# Patient Record
Sex: FEMALE
[De-identification: names, ages, dates, MRNs, and addresses within clinical notes are randomized per-mention and may not be internally consistent; named-entity substitution may affect disease eponyms.]

---

## 2024-09-10 ENCOUNTER — APPOINTMENT (OUTPATIENT)
Dept: OBGYN | Facility: CLINIC | Age: 39
End: 2024-09-10

## 2024-09-10 VITALS
SYSTOLIC BLOOD PRESSURE: 119 MMHG | BODY MASS INDEX: 32.82 KG/M2 | HEIGHT: 65 IN | WEIGHT: 197 LBS | DIASTOLIC BLOOD PRESSURE: 78 MMHG

## 2024-09-10 DIAGNOSIS — Z34.91 ENCOUNTER FOR SUPERVISION OF NORMAL PREGNANCY, UNSPECIFIED, FIRST TRIMESTER: ICD-10-CM

## 2024-09-10 PROBLEM — Z00.00 ENCOUNTER FOR PREVENTIVE HEALTH EXAMINATION: Status: ACTIVE | Noted: 2024-09-10

## 2024-09-10 PROCEDURE — 99203 OFFICE O/P NEW LOW 30 MIN: CPT | Mod: 25

## 2024-09-10 PROCEDURE — 76817 TRANSVAGINAL US OBSTETRIC: CPT

## 2024-09-10 PROCEDURE — 36415 COLL VENOUS BLD VENIPUNCTURE: CPT

## 2024-09-10 NOTE — END OF VISIT
[FreeTextEntry3] : I, Teresa Herrera, acted as a scribe on behalf of Dr. Clarissa Brooks M.D. on 09/10/2024.   All medical entries made by the scribe were at my Dr. Clarissa Brooks M.D direction and personally dictated by me on 09/10/2024. I have reviewed the chart and agree that the record accurately reflects my personal performance of the history, physical exam, assessment and plan. I have also personally directed, reviewed, and agreed with the chart.

## 2024-09-10 NOTE — PLAN
[FreeTextEntry1] : 39 year old  measuring 7 weeks 5 days for an amenorrhea visit.  -NOB bloods -Pap done today -Pt to bring genetics to next appt -Early GCT for h/o PCOS -Advised to start bASA daily for AMA -RTO 5 weeks for NT/NIPS

## 2024-09-10 NOTE — HISTORY OF PRESENT ILLNESS
[FreeTextEntry1] : 39 year old  at 7 weeks 5 days presents for an amenorrhea visit. +HPT.  OBHx:  x1 (10/2020, girl) GYNHx: PCOS  PMHx: Subclinical hypothyroidism PSHx: None  FHx: Chronic htn (mother, father), hyperlipidemia (mother, father), glaucoma (mother, grandfather), prostate cancer (father) SocHx: None  Meds: PNV NKDA [Mammogramdate] : 09/23 [PapSmeardate] : 06/23

## 2024-09-13 LAB
25(OH)D3 SERPL-MCNC: 32.2 NG/ML
ABO + RH PNL BLD: NORMAL
B19V IGG SER QL IA: 7.57 INDEX
B19V IGG+IGM SER-IMP: NORMAL
B19V IGG+IGM SER-IMP: POSITIVE
B19V IGM FLD-ACNC: 0.19 INDEX
B19V IGM SER-ACNC: NEGATIVE
BACTERIA UR CULT: NORMAL
BASOPHILS # BLD AUTO: 0.05 K/UL
BASOPHILS NFR BLD AUTO: 0.6 %
BLD GP AB SCN SERPL QL: NORMAL
BV BACTERIA RRNA VAG QL NAA+PROBE: DETECTED
C GLABRATA RNA VAG QL NAA+PROBE: NOT DETECTED
C TRACH RRNA SPEC QL NAA+PROBE: NOT DETECTED
CANDIDA RRNA VAG QL PROBE: NOT DETECTED
EOSINOPHIL # BLD AUTO: 0.09 K/UL
EOSINOPHIL NFR BLD AUTO: 1 %
HBV SURFACE AG SER QL: NONREACTIVE
HCT VFR BLD CALC: 37.3 %
HCV AB SER QL: NONREACTIVE
HCV S/CO RATIO: 0.13 S/CO
HGB A MFR BLD: 98.1 %
HGB A2 MFR BLD: 1.6 %
HGB BLD-MCNC: 13 G/DL
HGB F MFR BLD: 0.3 %
HGB FRACT BLD-IMP: NORMAL
HIV1+2 AB SPEC QL IA.RAPID: NONREACTIVE
HPV HIGH+LOW RISK DNA PNL CVX: NOT DETECTED
IMM GRANULOCYTES NFR BLD AUTO: 0.8 %
LEAD BLD-MCNC: <1 UG/DL
LYMPHOCYTES # BLD AUTO: 2.89 K/UL
LYMPHOCYTES NFR BLD AUTO: 32.1 %
MAN DIFF?: NORMAL
MCHC RBC-ENTMCNC: 32.1 PG
MCHC RBC-ENTMCNC: 34.9 GM/DL
MCV RBC AUTO: 92.1 FL
MEV IGG FLD QL IA: 5.91 AU/ML
MEV IGG+IGM SER-IMP: NEGATIVE
MONOCYTES # BLD AUTO: 0.51 K/UL
MONOCYTES NFR BLD AUTO: 5.7 %
MUV AB SER-ACNC: POSITIVE
MUV IGG SER QL IA: 95.6 AU/ML
N GONORRHOEA RRNA SPEC QL NAA+PROBE: NOT DETECTED
NEUTROPHILS # BLD AUTO: 5.39 K/UL
NEUTROPHILS NFR BLD AUTO: 59.8 %
PLATELET # BLD AUTO: 336 K/UL
RBC # BLD: 4.05 M/UL
RBC # FLD: 12.8 %
RUBV IGG FLD-ACNC: 7.12 INDEX
RUBV IGG FLD-ACNC: 7.12 INDEX
RUBV IGG SER-IMP: POSITIVE
RUBV IGG SER-IMP: POSITIVE
T PALLIDUM AB SER QL IA: NEGATIVE
T VAGINALIS RRNA SPEC QL NAA+PROBE: NOT DETECTED
TSH SERPL-ACNC: 3.44 UIU/ML
VZV AB TITR SER: POSITIVE
VZV IGG SER IF-ACNC: 2.95 S/CO
WBC # FLD AUTO: 9 K/UL

## 2024-09-17 ENCOUNTER — TRANSCRIPTION ENCOUNTER (OUTPATIENT)
Age: 39
End: 2024-09-17

## 2024-09-18 ENCOUNTER — TRANSCRIPTION ENCOUNTER (OUTPATIENT)
Age: 39
End: 2024-09-18

## 2024-09-18 DIAGNOSIS — N76.0 ACUTE VAGINITIS: ICD-10-CM

## 2024-09-18 DIAGNOSIS — B96.89 ACUTE VAGINITIS: ICD-10-CM

## 2024-09-18 RX ORDER — CLINDAMYCIN PHOSPHATE 100 MG/1
100 SUPPOSITORY VAGINAL
Qty: 3 | Refills: 0 | Status: ACTIVE | COMMUNITY
Start: 2024-09-18 | End: 1900-01-01

## 2024-09-19 ENCOUNTER — TRANSCRIPTION ENCOUNTER (OUTPATIENT)
Age: 39
End: 2024-09-19

## 2024-09-19 RX ORDER — METRONIDAZOLE 7.5 MG/G
0.75 GEL VAGINAL
Qty: 1 | Refills: 0 | Status: ACTIVE | COMMUNITY
Start: 2024-09-19 | End: 1900-01-01

## 2024-09-23 ENCOUNTER — TRANSCRIPTION ENCOUNTER (OUTPATIENT)
Age: 39
End: 2024-09-23

## 2024-09-24 ENCOUNTER — APPOINTMENT (OUTPATIENT)
Dept: OBGYN | Facility: CLINIC | Age: 39
End: 2024-09-24

## 2024-10-04 PROBLEM — N91.1 SECONDARY AMENORRHEA: Status: ACTIVE | Noted: 2024-10-04

## 2024-10-12 ENCOUNTER — NON-APPOINTMENT (OUTPATIENT)
Age: 39
End: 2024-10-12

## 2024-10-14 ENCOUNTER — NON-APPOINTMENT (OUTPATIENT)
Age: 39
End: 2024-10-14

## 2024-10-14 ENCOUNTER — TRANSCRIPTION ENCOUNTER (OUTPATIENT)
Age: 39
End: 2024-10-14

## 2024-10-15 ENCOUNTER — NON-APPOINTMENT (OUTPATIENT)
Age: 39
End: 2024-10-15

## 2024-10-15 ENCOUNTER — APPOINTMENT (OUTPATIENT)
Dept: OBGYN | Facility: CLINIC | Age: 39
End: 2024-10-15
Payer: COMMERCIAL

## 2024-10-15 ENCOUNTER — ASOB RESULT (OUTPATIENT)
Age: 39
End: 2024-10-15

## 2024-10-15 VITALS
DIASTOLIC BLOOD PRESSURE: 76 MMHG | HEART RATE: 84 BPM | WEIGHT: 196 LBS | BODY MASS INDEX: 32.65 KG/M2 | HEIGHT: 65 IN | SYSTOLIC BLOOD PRESSURE: 122 MMHG

## 2024-10-15 PROCEDURE — 76813 OB US NUCHAL MEAS 1 GEST: CPT

## 2024-10-15 PROCEDURE — 36415 COLL VENOUS BLD VENIPUNCTURE: CPT

## 2024-10-15 PROCEDURE — 76801 OB US < 14 WKS SINGLE FETUS: CPT

## 2024-10-15 PROCEDURE — 0500F INITIAL PRENATAL CARE VISIT: CPT

## 2024-10-21 ENCOUNTER — TRANSCRIPTION ENCOUNTER (OUTPATIENT)
Age: 39
End: 2024-10-21

## 2024-10-21 LAB
GLUCOSE QUALITATIVE U: NEGATIVE
PROTEIN URINE: NEGATIVE

## 2024-10-22 ENCOUNTER — TRANSCRIPTION ENCOUNTER (OUTPATIENT)
Age: 39
End: 2024-10-22

## 2024-10-24 ENCOUNTER — TRANSCRIPTION ENCOUNTER (OUTPATIENT)
Age: 39
End: 2024-10-24

## 2024-11-15 ENCOUNTER — NON-APPOINTMENT (OUTPATIENT)
Age: 39
End: 2024-11-15

## 2024-11-15 ENCOUNTER — APPOINTMENT (OUTPATIENT)
Dept: OBGYN | Facility: CLINIC | Age: 39
End: 2024-11-15
Payer: COMMERCIAL

## 2024-11-15 DIAGNOSIS — Z34.92 ENCOUNTER FOR SUPERVISION OF NORMAL PREGNANCY, UNSPECIFIED, SECOND TRIMESTER: ICD-10-CM

## 2024-11-15 DIAGNOSIS — Z23 ENCOUNTER FOR IMMUNIZATION: ICD-10-CM

## 2024-11-15 PROCEDURE — 36415 COLL VENOUS BLD VENIPUNCTURE: CPT

## 2024-11-15 PROCEDURE — 90656 IIV3 VACC NO PRSV 0.5 ML IM: CPT

## 2024-11-15 PROCEDURE — 0502F SUBSEQUENT PRENATAL CARE: CPT

## 2024-11-15 PROCEDURE — 90471 IMMUNIZATION ADMIN: CPT

## 2024-11-18 ENCOUNTER — TRANSCRIPTION ENCOUNTER (OUTPATIENT)
Age: 39
End: 2024-11-18

## 2024-11-18 LAB
AFP INTERP SERPL-IMP: NORMAL
AFP INTERP SERPL-IMP: NORMAL
AFP MOM CUT-OFF: 2.5
AFP MOM SERPL: 0.92
AFP PERCENTILE: 40.4
AFP SERPL-ACNC: 30.14 NG/ML
CARBAMAZEPINE?: NO
CURRENT SMOKER: NORMAL
DIABETES STATUS PATIENT: NORMAL
GA: NORMAL
GESTATIONAL AGE METHOD: NORMAL
HX OF NTD NARR: NORMAL
MULTIPLE PREGNANCY: NORMAL
NEURAL TUBE DEFECT RISK FETUS: NORMAL
NEURAL TUBE DEFECT RISK POP: NORMAL
RECOM F/U: NO
TEST PERFORMANCE INFO SPEC: NORMAL
VALPROIC ACID?: NORMAL

## 2024-11-20 ENCOUNTER — NON-APPOINTMENT (OUTPATIENT)
Age: 39
End: 2024-11-20

## 2024-11-20 ENCOUNTER — APPOINTMENT (OUTPATIENT)
Dept: OBGYN | Facility: CLINIC | Age: 39
End: 2024-11-20

## 2024-11-20 ENCOUNTER — TRANSCRIPTION ENCOUNTER (OUTPATIENT)
Age: 39
End: 2024-11-20

## 2024-11-20 ENCOUNTER — APPOINTMENT (OUTPATIENT)
Dept: OBGYN | Facility: CLINIC | Age: 39
End: 2024-11-20
Payer: COMMERCIAL

## 2024-11-20 DIAGNOSIS — Z13.1 ENCOUNTER FOR SCREENING FOR DIABETES MELLITUS: ICD-10-CM

## 2024-11-20 DIAGNOSIS — E28.2 POLYCYSTIC OVARIAN SYNDROME: ICD-10-CM

## 2024-11-20 PROCEDURE — 36415 COLL VENOUS BLD VENIPUNCTURE: CPT

## 2024-11-21 ENCOUNTER — TRANSCRIPTION ENCOUNTER (OUTPATIENT)
Age: 39
End: 2024-11-21

## 2024-11-21 LAB — GLUCOSE 1H P 50 G GLC PO SERPL-MCNC: 115 MG/DL

## 2024-12-06 ENCOUNTER — APPOINTMENT (OUTPATIENT)
Dept: ANTEPARTUM | Facility: CLINIC | Age: 39
End: 2024-12-06
Payer: COMMERCIAL

## 2024-12-06 ENCOUNTER — ASOB RESULT (OUTPATIENT)
Age: 39
End: 2024-12-06

## 2024-12-06 PROCEDURE — 76811 OB US DETAILED SNGL FETUS: CPT

## 2024-12-17 ENCOUNTER — NON-APPOINTMENT (OUTPATIENT)
Age: 39
End: 2024-12-17

## 2024-12-17 ENCOUNTER — APPOINTMENT (OUTPATIENT)
Dept: OBGYN | Facility: CLINIC | Age: 39
End: 2024-12-17

## 2024-12-17 PROCEDURE — 0502F SUBSEQUENT PRENATAL CARE: CPT

## 2024-12-27 ENCOUNTER — TRANSCRIPTION ENCOUNTER (OUTPATIENT)
Age: 39
End: 2024-12-27

## 2025-01-05 ENCOUNTER — OUTPATIENT (OUTPATIENT)
Dept: OUTPATIENT SERVICES | Facility: HOSPITAL | Age: 40
LOS: 1 days | End: 2025-01-05
Payer: COMMERCIAL

## 2025-01-05 VITALS
DIASTOLIC BLOOD PRESSURE: 71 MMHG | HEART RATE: 83 BPM | RESPIRATION RATE: 18 BRPM | SYSTOLIC BLOOD PRESSURE: 114 MMHG | TEMPERATURE: 98 F

## 2025-01-05 VITALS
HEART RATE: 91 BPM | RESPIRATION RATE: 18 BRPM | TEMPERATURE: 98 F | SYSTOLIC BLOOD PRESSURE: 116 MMHG | DIASTOLIC BLOOD PRESSURE: 65 MMHG | OXYGEN SATURATION: 99 %

## 2025-01-05 DIAGNOSIS — O26.899 OTHER SPECIFIED PREGNANCY RELATED CONDITIONS, UNSPECIFIED TRIMESTER: ICD-10-CM

## 2025-01-05 LAB
APPEARANCE UR: CLEAR — SIGNIFICANT CHANGE UP
BACTERIA # UR AUTO: NEGATIVE /HPF — SIGNIFICANT CHANGE UP
BILIRUB UR-MCNC: NEGATIVE — SIGNIFICANT CHANGE UP
CAST: 0 /LPF — SIGNIFICANT CHANGE UP (ref 0–4)
COLOR SPEC: YELLOW — SIGNIFICANT CHANGE UP
DIFF PNL FLD: NEGATIVE — SIGNIFICANT CHANGE UP
GLUCOSE UR QL: NEGATIVE MG/DL — SIGNIFICANT CHANGE UP
KETONES UR-MCNC: NEGATIVE MG/DL — SIGNIFICANT CHANGE UP
LEUKOCYTE ESTERASE UR-ACNC: NEGATIVE — SIGNIFICANT CHANGE UP
NITRITE UR-MCNC: NEGATIVE — SIGNIFICANT CHANGE UP
PH UR: 5.5 — SIGNIFICANT CHANGE UP (ref 5–8)
PROT UR-MCNC: NEGATIVE MG/DL — SIGNIFICANT CHANGE UP
RBC CASTS # UR COMP ASSIST: 1 /HPF — SIGNIFICANT CHANGE UP (ref 0–4)
SP GR SPEC: 1.01 — SIGNIFICANT CHANGE UP (ref 1–1.03)
SQUAMOUS # UR AUTO: 0 /HPF — SIGNIFICANT CHANGE UP (ref 0–5)
UROBILINOGEN FLD QL: 0.2 MG/DL — SIGNIFICANT CHANGE UP (ref 0.2–1)
WBC UR QL: 0 /HPF — SIGNIFICANT CHANGE UP (ref 0–5)

## 2025-01-05 PROCEDURE — 59025 FETAL NON-STRESS TEST: CPT

## 2025-01-05 PROCEDURE — G0463: CPT

## 2025-01-05 PROCEDURE — 81001 URINALYSIS AUTO W/SCOPE: CPT

## 2025-01-05 NOTE — OB RN TRIAGE NOTE - FALL HARM RISK - UNIVERSAL INTERVENTIONS
Bed in lowest position, wheels locked, appropriate side rails in place/Call bell, personal items and telephone in reach/Instruct patient to call for assistance before getting out of bed or chair/Non-slip footwear when patient is out of bed/[place] to call system/Physically safe environment - no spills, clutter or unnecessary equipment/Purposeful Proactive Rounding/Room/bathroom lighting operational, light cord in reach

## 2025-01-05 NOTE — OB PROVIDER TRIAGE NOTE - NSHPPHYSICALEXAM_GEN_ALL_CORE
Vital Signs Last 24 Hrs  T(C): 36.7 (05 Jan 2025 19:25), Max: 36.7 (05 Jan 2025 19:25)  T(F): 98.1 (05 Jan 2025 19:25), Max: 98.1 (05 Jan 2025 19:25)  HR: 86 (05 Jan 2025 19:52) (86 - 94)  BP: 116/65 (05 Jan 2025 19:32) (116/65 - 116/65)  BP(mean): --  RR: 18 (05 Jan 2025 19:25) (18 - 18)  SpO2: 98% (05 Jan 2025 19:52) (98% - 99%)    Parameters below as of 05 Jan 2025 19:25  Patient On (Oxygen Delivery Method): room air        SVE: -Bleeding, - Pooling  FHT: 150 baseline, mod variability   [place]: not bismark   Sono: Vertex Vital Signs Last 24 Hrs  T(C): 36.7 (05 Jan 2025 19:25), Max: 36.7 (05 Jan 2025 19:25)  T(F): 98.1 (05 Jan 2025 19:25), Max: 98.1 (05 Jan 2025 19:25)  HR: 86 (05 Jan 2025 19:52) (86 - 94)  BP: 116/65 (05 Jan 2025 19:32) (116/65 - 116/65)  BP(mean): --  RR: 18 (05 Jan 2025 19:25) (18 - 18)  SpO2: 98% (05 Jan 2025 19:52) (98% - 99%)    Parameters below as of 05 Jan 2025 19:25  Patient On (Oxygen Delivery Method): room air      Abdomen: Soft. Non-tender  SSE: Cervix visualized. No bleeding in the vault or from the os  : Hemorrhoid appreciated but no bleeding seen   FHT: 150 baseline, mod variability   [place]: No contractions  Sono: Vertex. Anterior placenta Vital Signs Last 24 Hrs  T(C): 36.7 (05 Jan 2025 19:25), Max: 36.7 (05 Jan 2025 19:25)  T(F): 98.1 (05 Jan 2025 19:25), Max: 98.1 (05 Jan 2025 19:25)  HR: 86 (05 Jan 2025 19:52) (86 - 94)  BP: 116/65 (05 Jan 2025 19:32) (116/65 - 116/65)  BP(mean): --  RR: 18 (05 Jan 2025 19:25) (18 - 18)  SpO2: 98% (05 Jan 2025 19:52) (98% - 99%)    Parameters below as of 05 Jan 2025 19:25  Patient On (Oxygen Delivery Method): room air      Abdomen: Soft. Non-tender  SSE: Cervix visualized. No bleeding in the vault or from the os  : Hemorrhoid seen but no bleeding seen   FHT: 150 baseline, mod variability   [place]: No contractions  Sono: Vertex. Anterior placenta. CL 4.2cm. Gross fetal movement seen

## 2025-01-05 NOTE — OB PROVIDER TRIAGE NOTE - NSOBPROVIDERNOTE_OBGYN_ALL_OB_FT
A/P: Pt is a 39y G_P_ who presents [active labor/SROM/PROM/ for induction of labor].      1. Admit to Labor and Delivery. Routine Labs. IV Fluids  2. Expectant Management vs. IOL  3. Fetus: Vertex, Reactive/Continue fetal monitoring  4.   5. GBS pos, for Amp / GBS neg  6. Pain: IV pain meds/epidural PRN      Aries Parada, PGY-  Obstetrics and Gynecology    Discussed with A/P: 39y  at 24w3 presenting for vaginal spotting.    **incomplete note**     Elizabeth Boyd, PGY2  Aries Parada, PGY-  Obstetrics and Gynecology    Discussed with A/P: 39y  at 24w3d presenting for reported vaginal spotting. Speculum exam without any bleeding. In the setting of a long cervix, visually closed cervix, and the absence of contractions on tocometry; suspicion for pre-term labor is low. Fetal status is otherwise reassuring and appropriate for gestational age. Rh status is positive on outpatient [employer] record. UA demosntrated ***      Elizabeth Boyd, PGY2  Aries Parada, PGY-3  Obstetrics and Gynecology    Discussed with A/P: 39y  at 24w3d presenting for reported vaginal spotting. Speculum exam without any bleeding. In the setting of a long cervix, visually closed cervix, and the absence of contractions on tocometry; suspicion for pre-term labor is low. Fetal status is otherwise reassuring and appropriate for gestational age. Rh status is positive on outpatient [employer] record. UA unremarkable  - Will discharge home    Elizabeth Boyd, PGY2  Aries Parada, PGY-3  Obstetrics and Gynecology    Discussed with Dr. KENDY Bean

## 2025-01-05 NOTE — OB PROVIDER TRIAGE NOTE - HISTORY OF PRESENT ILLNESS
HPI: Pt is a 39y  G_P_  presenting for X.  Fetal movement (+)  Leakage of fluid (-)  Contractions (-)  Vaginal bleeding (-)  GBS pos/neg  Estimated fetal weight:    Prenatal care complicated by     OBHx:  GynHx: Denies any gynecologic issues  PMHx: Denies  PSHx: Denies  Med: PNV  All: NKDA  Psych: Denies hx of mental health issues  SH: Denies hx of smoking, drinking, or drug usage during the pregnancy    Vital Signs Last 24 Hrs  T(C): 36.7 (05 Jan 2025 19:25), Max: 36.7 (05 Jan 2025 19:25)  T(F): 98.1 (05 Jan 2025 19:25), Max: 98.1 (05 Jan 2025 19:25)  HR: 86 (05 Jan 2025 19:52) (86 - 94)  BP: 116/65 (05 Jan 2025 19:32) (116/65 - 116/65)  BP(mean): --  RR: 18 (05 Jan 2025 19:25) (18 - 18)  SpO2: 98% (05 Jan 2025 19:52) (98% - 99%)    Parameters below as of 05 Jan 2025 19:25  Patient On (Oxygen Delivery Method): room air        SVE:  FHT:  [place]:  Sono: Vertex           HPI: Pt is a 39y  at 24w3 presenting for vaginal spotting. Patient had spot of maroon blood when wiping after bowel movement earlier today. Patient feels blood was from wiping in the front not the back. Patient urinated upon presenting and at  that time she did not have vaginal bleeding or spotting. Patient denies prior episodes of spotting.       Leakage of fluid (-)  Contractions (-)  Vaginal bleeding (-)      Prenatal care with out complication     OBHx:   G1 ():  FT, uncomplicated   GynHx: Denies any gynecologic issues  PMHx: Denies  PSHx: Denies  Med: PNV, bASA   All: NKDA             HPI: Pt is a 39y  at 24w3 presenting for vaginal spotting. Patient had spot of maroon blood when wiping after bowel movement earlier today. Patient feels blood was from wiping in the front not the back. Patient urinated upon presenting and at  that time she did not have vaginal bleeding or spotting. Patient denies prior episodes of spotting.       Leakage of fluid (-)  Contractions (-)  Vaginal bleeding (-)      Prenatal care with out complication     OBHx:   G1 ():  FT, uncomplicated   GynHx: PCOS Denies other gynecologic issues  PMHx: Denies  PSHx: Denies  Med: PNV, bASA   All: NKDA             HPI: Pt is a 39y  at 24w3 presenting for possible vaginal spotting. Patient had spot of maroon blood when wiping after bowel movement earlier today. Patient feels blood was from wiping in the front not the back. Patient urinated upon presenting and at that time she did not have vaginal bleeding or spotting. Patient denies prior episodes of spotting. Denies any shiraz bleeding. Denies any dysuria, hematuria, fevers or chills     Leakage of fluid (-)  Contractions (-)  Vaginal bleeding (-)    Prenatal care reportedly uncomplicated  - Review of anatomy sonogram with no previa noted     OBHx:   G1 ():  FT, uncomplicated   GynHx: PCOS Denies other gynecologic issues  PMHx: Denies  PSHx: Denies  Med: PNV, bASA   All: NKDA

## 2025-01-07 ENCOUNTER — TRANSCRIPTION ENCOUNTER (OUTPATIENT)
Age: 40
End: 2025-01-07

## 2025-01-07 ENCOUNTER — NON-APPOINTMENT (OUTPATIENT)
Age: 40
End: 2025-01-07

## 2025-01-09 DIAGNOSIS — Z3A.24 24 WEEKS GESTATION OF PREGNANCY: ICD-10-CM

## 2025-01-09 DIAGNOSIS — O99.282 ENDOCRINE, NUTRITIONAL AND METABOLIC DISEASES COMPLICATING PREGNANCY, SECOND TRIMESTER: ICD-10-CM

## 2025-01-09 DIAGNOSIS — O26.852 SPOTTING COMPLICATING PREGNANCY, SECOND TRIMESTER: ICD-10-CM

## 2025-01-09 DIAGNOSIS — O09.522 SUPERVISION OF ELDERLY MULTIGRAVIDA, SECOND TRIMESTER: ICD-10-CM

## 2025-01-09 DIAGNOSIS — O34.42 MATERNAL CARE FOR OTHER ABNORMALITIES OF CERVIX, SECOND TRIMESTER: ICD-10-CM

## 2025-01-10 ENCOUNTER — APPOINTMENT (OUTPATIENT)
Dept: OBGYN | Facility: CLINIC | Age: 40
End: 2025-01-10
Payer: COMMERCIAL

## 2025-01-10 PROCEDURE — 0502F SUBSEQUENT PRENATAL CARE: CPT

## 2025-01-22 ENCOUNTER — APPOINTMENT (OUTPATIENT)
Dept: OBGYN | Facility: CLINIC | Age: 40
End: 2025-01-22
Payer: COMMERCIAL

## 2025-01-22 ENCOUNTER — NON-APPOINTMENT (OUTPATIENT)
Age: 40
End: 2025-01-22

## 2025-01-22 VITALS
WEIGHT: 214 LBS | SYSTOLIC BLOOD PRESSURE: 100 MMHG | DIASTOLIC BLOOD PRESSURE: 58 MMHG | BODY MASS INDEX: 35.61 KG/M2 | HEART RATE: 80 BPM

## 2025-01-22 DIAGNOSIS — Z34.91 ENCOUNTER FOR SUPERVISION OF NORMAL PREGNANCY, UNSPECIFIED, FIRST TRIMESTER: ICD-10-CM

## 2025-01-22 DIAGNOSIS — Z23 ENCOUNTER FOR IMMUNIZATION: ICD-10-CM

## 2025-01-22 DIAGNOSIS — Z86.39 PERSONAL HISTORY OF OTHER ENDOCRINE, NUTRITIONAL AND METABOLIC DISEASE: ICD-10-CM

## 2025-01-22 DIAGNOSIS — N91.1 SECONDARY AMENORRHEA: ICD-10-CM

## 2025-01-22 PROCEDURE — 0502F SUBSEQUENT PRENATAL CARE: CPT

## 2025-01-22 PROCEDURE — 90715 TDAP VACCINE 7 YRS/> IM: CPT

## 2025-01-22 PROCEDURE — 90471 IMMUNIZATION ADMIN: CPT

## 2025-01-22 PROCEDURE — 36415 COLL VENOUS BLD VENIPUNCTURE: CPT

## 2025-01-23 ENCOUNTER — TRANSCRIPTION ENCOUNTER (OUTPATIENT)
Age: 40
End: 2025-01-23

## 2025-01-23 LAB
25(OH)D3 SERPL-MCNC: 30.2 NG/ML
BLD GP AB SCN SERPL QL: NORMAL
FERRITIN SERPL-MCNC: 36 NG/ML
GLUCOSE 1H P 50 G GLC PO SERPL-MCNC: 128 MG/DL
HCT VFR BLD CALC: 35.3 %
HGB BLD-MCNC: 11.9 G/DL
MCHC RBC-ENTMCNC: 33.5 PG
MCHC RBC-ENTMCNC: 33.7 G/DL
MCV RBC AUTO: 99.4 FL
PLATELET # BLD AUTO: 273 K/UL
RBC # BLD: 3.55 M/UL
RBC # FLD: 13.8 %
WBC # FLD AUTO: 9.51 K/UL

## 2025-01-24 ENCOUNTER — TRANSCRIPTION ENCOUNTER (OUTPATIENT)
Age: 40
End: 2025-01-24

## 2025-01-28 ENCOUNTER — NON-APPOINTMENT (OUTPATIENT)
Age: 40
End: 2025-01-28

## 2025-02-18 ENCOUNTER — NON-APPOINTMENT (OUTPATIENT)
Age: 40
End: 2025-02-18

## 2025-02-18 ENCOUNTER — APPOINTMENT (OUTPATIENT)
Dept: OBGYN | Facility: CLINIC | Age: 40
End: 2025-02-18
Payer: COMMERCIAL

## 2025-02-18 PROCEDURE — 0502F SUBSEQUENT PRENATAL CARE: CPT

## 2025-02-26 ENCOUNTER — APPOINTMENT (OUTPATIENT)
Dept: ANTEPARTUM | Facility: CLINIC | Age: 40
End: 2025-02-26

## 2025-03-04 ENCOUNTER — NON-APPOINTMENT (OUTPATIENT)
Age: 40
End: 2025-03-04

## 2025-03-05 ENCOUNTER — ASOB RESULT (OUTPATIENT)
Age: 40
End: 2025-03-05

## 2025-03-05 ENCOUNTER — APPOINTMENT (OUTPATIENT)
Dept: OBGYN | Facility: CLINIC | Age: 40
End: 2025-03-05
Payer: COMMERCIAL

## 2025-03-05 DIAGNOSIS — Z34.93 ENCOUNTER FOR SUPERVISION OF NORMAL PREGNANCY, UNSPECIFIED, THIRD TRIMESTER: ICD-10-CM

## 2025-03-05 DIAGNOSIS — Z34.92 ENCOUNTER FOR SUPERVISION OF NORMAL PREGNANCY, UNSPECIFIED, SECOND TRIMESTER: ICD-10-CM

## 2025-03-05 PROCEDURE — 76819 FETAL BIOPHYS PROFIL W/O NST: CPT | Mod: 59

## 2025-03-05 PROCEDURE — 0502F SUBSEQUENT PRENATAL CARE: CPT

## 2025-03-05 PROCEDURE — 76816 OB US FOLLOW-UP PER FETUS: CPT

## 2025-03-18 ENCOUNTER — NON-APPOINTMENT (OUTPATIENT)
Age: 40
End: 2025-03-18

## 2025-03-19 ENCOUNTER — APPOINTMENT (OUTPATIENT)
Dept: OBGYN | Facility: CLINIC | Age: 40
End: 2025-03-19
Payer: COMMERCIAL

## 2025-03-19 DIAGNOSIS — Z34.93 ENCOUNTER FOR SUPERVISION OF NORMAL PREGNANCY, UNSPECIFIED, THIRD TRIMESTER: ICD-10-CM

## 2025-03-19 PROCEDURE — 0502F SUBSEQUENT PRENATAL CARE: CPT

## 2025-04-02 ENCOUNTER — APPOINTMENT (OUTPATIENT)
Dept: OBGYN | Facility: CLINIC | Age: 40
End: 2025-04-02
Payer: COMMERCIAL

## 2025-04-02 ENCOUNTER — ASOB RESULT (OUTPATIENT)
Age: 40
End: 2025-04-02

## 2025-04-02 DIAGNOSIS — Z34.93 ENCOUNTER FOR SUPERVISION OF NORMAL PREGNANCY, UNSPECIFIED, THIRD TRIMESTER: ICD-10-CM

## 2025-04-02 PROCEDURE — 36415 COLL VENOUS BLD VENIPUNCTURE: CPT

## 2025-04-02 PROCEDURE — 76816 OB US FOLLOW-UP PER FETUS: CPT

## 2025-04-02 PROCEDURE — 0502F SUBSEQUENT PRENATAL CARE: CPT

## 2025-04-02 PROCEDURE — 76819 FETAL BIOPHYS PROFIL W/O NST: CPT | Mod: 59

## 2025-04-03 LAB
HIV1+2 AB SPEC QL IA.RAPID: NONREACTIVE
T PALLIDUM AB SER QL IA: NEGATIVE

## 2025-04-04 ENCOUNTER — TRANSCRIPTION ENCOUNTER (OUTPATIENT)
Age: 40
End: 2025-04-04

## 2025-04-04 LAB
GP B STREP DNA SPEC QL NAA+PROBE: NOT DETECTED
SOURCE GBS: NORMAL

## 2025-04-07 ENCOUNTER — TRANSCRIPTION ENCOUNTER (OUTPATIENT)
Age: 40
End: 2025-04-07

## 2025-04-09 ENCOUNTER — NON-APPOINTMENT (OUTPATIENT)
Age: 40
End: 2025-04-09

## 2025-04-09 ENCOUNTER — APPOINTMENT (OUTPATIENT)
Dept: OBGYN | Facility: CLINIC | Age: 40
End: 2025-04-09
Payer: COMMERCIAL

## 2025-04-09 DIAGNOSIS — O09.529 SUPERVISION OF ELDERLY MULTIGRAVIDA, UNSPECIFIED TRIMESTER: ICD-10-CM

## 2025-04-09 PROCEDURE — 0502F SUBSEQUENT PRENATAL CARE: CPT

## 2025-04-16 ENCOUNTER — APPOINTMENT (OUTPATIENT)
Dept: OBGYN | Facility: CLINIC | Age: 40
End: 2025-04-16
Payer: COMMERCIAL

## 2025-04-16 PROCEDURE — 0502F SUBSEQUENT PRENATAL CARE: CPT

## 2025-04-23 ENCOUNTER — APPOINTMENT (OUTPATIENT)
Dept: OBGYN | Facility: CLINIC | Age: 40
End: 2025-04-23
Payer: COMMERCIAL

## 2025-04-23 ENCOUNTER — ASOB RESULT (OUTPATIENT)
Age: 40
End: 2025-04-23

## 2025-04-23 PROCEDURE — 76818 FETAL BIOPHYS PROFILE W/NST: CPT

## 2025-04-23 PROCEDURE — 0502F SUBSEQUENT PRENATAL CARE: CPT

## 2025-04-23 PROCEDURE — ZZZZZ: CPT

## 2025-04-23 PROCEDURE — 76816 OB US FOLLOW-UP PER FETUS: CPT

## 2025-04-25 ENCOUNTER — NON-APPOINTMENT (OUTPATIENT)
Age: 40
End: 2025-04-25

## 2025-04-28 ENCOUNTER — TRANSCRIPTION ENCOUNTER (OUTPATIENT)
Age: 40
End: 2025-04-28

## 2025-04-28 ENCOUNTER — INPATIENT (INPATIENT)
Facility: HOSPITAL | Age: 40
LOS: 1 days | Discharge: HOME | DRG: 951 | End: 2025-04-30
Attending: OBSTETRICS & GYNECOLOGY | Admitting: OBSTETRICS & GYNECOLOGY
Payer: COMMERCIAL

## 2025-04-28 VITALS — OXYGEN SATURATION: 99 % | HEART RATE: 89 BPM

## 2025-04-28 DIAGNOSIS — Z34.80 ENCOUNTER FOR SUPERVISION OF OTHER NORMAL PREGNANCY, UNSPECIFIED TRIMESTER: ICD-10-CM

## 2025-04-28 DIAGNOSIS — O26.899 OTHER SPECIFIED PREGNANCY RELATED CONDITIONS, UNSPECIFIED TRIMESTER: ICD-10-CM

## 2025-04-28 LAB
BASOPHILS # BLD AUTO: 0.05 K/UL — SIGNIFICANT CHANGE UP (ref 0–0.2)
BASOPHILS NFR BLD AUTO: 0.5 % — SIGNIFICANT CHANGE UP (ref 0–2)
EOSINOPHIL # BLD AUTO: 0.08 K/UL — SIGNIFICANT CHANGE UP (ref 0–0.5)
EOSINOPHIL NFR BLD AUTO: 0.8 % — SIGNIFICANT CHANGE UP (ref 0–6)
HCT VFR BLD CALC: 33 % — LOW (ref 34.5–45)
HGB BLD-MCNC: 11.9 G/DL — SIGNIFICANT CHANGE UP (ref 11.5–15.5)
IMM GRANULOCYTES NFR BLD AUTO: 0.8 % — SIGNIFICANT CHANGE UP (ref 0–0.9)
LYMPHOCYTES # BLD AUTO: 2.07 K/UL — SIGNIFICANT CHANGE UP (ref 1–3.3)
LYMPHOCYTES # BLD AUTO: 20.8 % — SIGNIFICANT CHANGE UP (ref 13–44)
MCHC RBC-ENTMCNC: 33.3 PG — SIGNIFICANT CHANGE UP (ref 27–34)
MCHC RBC-ENTMCNC: 36.1 G/DL — HIGH (ref 32–36)
MCV RBC AUTO: 92.4 FL — SIGNIFICANT CHANGE UP (ref 80–100)
MONOCYTES # BLD AUTO: 0.59 K/UL — SIGNIFICANT CHANGE UP (ref 0–0.9)
MONOCYTES NFR BLD AUTO: 5.9 % — SIGNIFICANT CHANGE UP (ref 2–14)
NEUTROPHILS # BLD AUTO: 7.06 K/UL — SIGNIFICANT CHANGE UP (ref 1.8–7.4)
NEUTROPHILS NFR BLD AUTO: 71.2 % — SIGNIFICANT CHANGE UP (ref 43–77)
NRBC BLD AUTO-RTO: 0 /100 WBCS — SIGNIFICANT CHANGE UP (ref 0–0)
PLATELET # BLD AUTO: 224 K/UL — SIGNIFICANT CHANGE UP (ref 150–400)
RBC # BLD: 3.57 M/UL — LOW (ref 3.8–5.2)
RBC # FLD: 13.3 % — SIGNIFICANT CHANGE UP (ref 10.3–14.5)
T PALLIDUM AB TITR SER: NEGATIVE — SIGNIFICANT CHANGE UP
WBC # BLD: 9.93 K/UL — SIGNIFICANT CHANGE UP (ref 3.8–10.5)
WBC # FLD AUTO: 9.93 K/UL — SIGNIFICANT CHANGE UP (ref 3.8–10.5)

## 2025-04-28 RX ORDER — CITRIC ACID/SODIUM CITRATE 300-500 MG
15 SOLUTION, ORAL ORAL EVERY 6 HOURS
Refills: 0 | Status: DISCONTINUED | OUTPATIENT
Start: 2025-04-28 | End: 2025-04-29

## 2025-04-28 RX ORDER — SODIUM CHLORIDE 9 G/1000ML
1000 INJECTION, SOLUTION INTRAVENOUS ONCE
Refills: 0 | Status: COMPLETED | OUTPATIENT
Start: 2025-04-28 | End: 2025-04-28

## 2025-04-28 RX ORDER — SODIUM CHLORIDE 9 G/1000ML
1000 INJECTION, SOLUTION INTRAVENOUS
Refills: 0 | Status: DISCONTINUED | OUTPATIENT
Start: 2025-04-28 | End: 2025-04-29

## 2025-04-28 RX ORDER — OXYTOCIN-SODIUM CHLORIDE 0.9% IV SOLN 30 UNIT/500ML 30-0.9/5 UT/ML-%
167 SOLUTION INTRAVENOUS
Qty: 30 | Refills: 0 | Status: DISCONTINUED | OUTPATIENT
Start: 2025-04-28 | End: 2025-04-30

## 2025-04-28 RX ORDER — OXYTOCIN-SODIUM CHLORIDE 0.9% IV SOLN 30 UNIT/500ML 30-0.9/5 UT/ML-%
4 SOLUTION INTRAVENOUS
Qty: 30 | Refills: 0 | Status: DISCONTINUED | OUTPATIENT
Start: 2025-04-28 | End: 2025-04-28

## 2025-04-28 RX ORDER — OXYTOCIN-SODIUM CHLORIDE 0.9% IV SOLN 30 UNIT/500ML 30-0.9/5 UT/ML-%
4 SOLUTION INTRAVENOUS
Qty: 30 | Refills: 0 | Status: DISCONTINUED | OUTPATIENT
Start: 2025-04-28 | End: 2025-04-30

## 2025-04-28 RX ADMIN — OXYTOCIN-SODIUM CHLORIDE 0.9% IV SOLN 30 UNIT/500ML 4 MILLIUNIT(S)/MIN: 30-0.9/5 SOLUTION at 15:48

## 2025-04-28 RX ADMIN — OXYTOCIN-SODIUM CHLORIDE 0.9% IV SOLN 30 UNIT/500ML 4 MILLIUNIT(S)/MIN: 30-0.9/5 SOLUTION at 20:27

## 2025-04-28 RX ADMIN — SODIUM CHLORIDE 125 MILLILITER(S): 9 INJECTION, SOLUTION INTRAVENOUS at 12:50

## 2025-04-28 RX ADMIN — SODIUM CHLORIDE 1000 MILLILITER(S): 9 INJECTION, SOLUTION INTRAVENOUS at 18:29

## 2025-04-28 NOTE — OB PROVIDER H&P - NSHPPHYSICALEXAM_GEN_ALL_CORE
PE:    T(C): 36.8 (04-28-25 @ 06:10), Max: 36.8 (04-28-25 @ 06:10)  HR: 87 (04-28-25 @ 07:35) (73 - 97)  BP: 119/75 (04-28-25 @ 06:10) (119/75 - 124/79)  RR: 17 (04-28-25 @ 06:10) (17 - 17)  SpO2: 92% (04-28-25 @ 07:35) (92% - 100%)    General: NAD, A&Ox3  CV: RRR  Lungs: Clear bilat   Abd: soft, nontender, gravid  VE: 1/50/-3  BSUS:    NST  Baseline  (  135  ) BPM  Variability ( x )  Moderate   (  ) Minimal  (  ) Absent  (  )  Marked  Accelerations ( x ) 15x15   (  ) 10x10  (  ) no  Decelerations ( x ) no  (  ) Variable  (  ) Early  (  ) Late      Description _________  Contractions (  ) no  ( x ) yes     Description  irregular [id number]  Interpretation ( x ) reactive   (  )  non-reactive PE:    T(C): 36.8 (04-28-25 @ 06:10), Max: 36.8 (04-28-25 @ 06:10)  HR: 87 (04-28-25 @ 07:35) (73 - 97)  BP: 119/75 (04-28-25 @ 06:10) (119/75 - 124/79)  RR: 17 (04-28-25 @ 06:10) (17 - 17)  SpO2: 92% (04-28-25 @ 07:35) (92% - 100%)    General: NAD, A&Ox3  CV: RRR  Lungs: Clear bilat   Abd: soft, nontender, gravid  VE: 1/50/-3  BSUS: vertex, BPP 8/8, KRISTA 13.6    NST  Baseline  (  135  ) BPM  Variability ( x )  Moderate   (  ) Minimal  (  ) Absent  (  )  Marked  Accelerations ( x ) 15x15   (  ) 10x10  (  ) no  Decelerations ( x ) no  (  ) Variable  (  ) Early  (  ) Late      Description _________  Contractions (  ) no  ( x ) yes     Description  irregular [id number]  Interpretation ( x ) reactive   (  )  non-reactive

## 2025-04-28 NOTE — OB PROVIDER H&P - NSLOWPPHRISK_OBGYN_A_OB
No previous uterine incision/[place] Pregnancy/Less than or equal to 4 previous vaginal births/No known bleeding disorder/No history of postpartum hemorrhage/No other PPH risks indicated

## 2025-04-28 NOTE — OB RN PATIENT PROFILE - COMFORT/ACCEPTABLE PAIN LEVEL (0-10)
Preeti has gained 5 ounces in 7 days which is good, also lots of wet and poopy diapers !    Great exam 
0

## 2025-04-28 NOTE — OB RN PATIENT PROFILE - POST PARTUM DEPRESSION SCREEN OB 4
Patient presents s/p syncopal episode at [facility].  Pt denies drinking/drug use, is active smoker.  reports she thinks she is dehydrated and didn't eat much today.  reports cough x a few weeks with mucous production.  No cough heard by RN.  Patient is requesting to go home, is sinus tachycardia on cardiac monitor, saturating well on room air.  denies chest pain/respiratory distress.  notified dr harper of pt concerns.
no

## 2025-04-28 NOTE — OB PROVIDER H&P - NS_ANESTHECONSULT_OBGYN_ALL_OB
April 12, 2018     Lucien Ferris, [address] [facility] [address]    Patient: Jocelyn Franklin   YOB: 1951   Date of Visit: 4/12/2018       Dear Dr Staci Cordoba: Thank you for referring Jessica Allen to me for evaluation  Below are my notes for this consultation  If you have questions, please do not hesitate to call me  I look forward to following your patient along with you           Sincerely,        Flako Altamirano MD        CC: No Recipients
N/A

## 2025-04-28 NOTE — OB PROVIDER H&P - ASSESSMENT
A/P: 39yo  @ 40w4d here for IOL  - Admit to L&D  - Routine labs   - EFM/TOCO: resuscitative measures prn  - Anesthesia consult for epidural prn  - Expect     d/w Dr. Cecilia Garcia, PA-C A/P: 39yo  @ 40w4d here for IOL  - Admit to L&D  - Routine labs   - EFM/TOCO: resuscitative measures prn  - Buccal cytotec as IOL agent  - Anesthesia consult for epidural prn  - Expect     d/w Dr. Cecilia Garcia, PADiegoC

## 2025-04-28 NOTE — OB PROVIDER H&P - CURRENT PREGNANCY COMPLICATIONS, OB PROFILE
Admit packet re-faxed to [facility], [place], [facility] [place], [facility], [facility] [place], [facility], [facility], [place], [facility], [facility], [facility], [facility], [facility], [facility], [facility], [facility], [facility], [facility], [facility], [facility], [facility], [facility], [facility], [place], [facility], [facility], [facility], [facility], [facility], [place],and [facility].  Waiting for response.     None

## 2025-04-28 NOTE — OB RN PATIENT PROFILE - LIMIT VISITORS, INFANT PROFILE
Nephrology Consult Note  [facility]. [place]        Reason for Consult: ESKD    Outpatient HD unit: [facility]  Days of dialysis: MWF. Last HD was on 3/14/22. Duration of each treatment: 3.5H  Vascular access: [facility]  EDW: 110kg    HISTORY OF PRESENT ILLNESS:      The patientis a 49 y.o. male who came in because of SOB. Patient had his HD last on 3/14/22 but only ran for 1H, last full treatment on 3/11. Patient said he went to the outpatient HDU yesterday but his TDC did not work despite putting cath-cathi.  He was referred to the access center but said he did not wake up in time hence decided to come in to the hospital.        Past Medical History:        Diagnosis Date    Ambulatory dysfunction     walker for long distances, SOB with distance    Aortic stenosis     echo 2017    Arthritis     hands and hips    Asthma     Bilateral hilar adenopathy syndrome 6/3/2013    CAD (coronary artery disease)     Dr. Iline Babinski [facility]) 04/19/2019    EF= 43%    CHF (congestive heart failure) ([facility])     Chronic pain     COPD (chronic obstructive pulmonary disease) ([address]     pulmonology Dr. Lorie Crowe    Depression     Diabetes mellitus ([address]     borderline    Difficult intravenous access     Emphysema of lung ([address]     ESRD (end stage renal disease) on dialysis ([address]     MWF    Fear of needles     Gastric ulcer     GERD (gastroesophageal reflux disease)     Heart valve problem     bicuspic valve    Hemodialysis patient ([address]     History of spinal fracture     work incident    Hx of blood clots     Bilateral lower extremities; stents in place    Hyperlipidemia     Hypertension     MI (myocardial infarction) ([address] 2019    has had 9 MIs. 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    Numbness and tingling in left arm     from fistula    Pneumonia     PONV (postoperative nausea and vomiting)     Prolonged emergence from general anesthesia     States requires more medication than most people    Sleep apnea     Uses CPAP    Stroke ([facility]     7mm thalamic cva 2017 deficts left side, left side weakness    TIA (transient ischemic attack)     Unspecified diseases of blood and blood-forming organs        Past Surgical History:        Procedure Laterality Date    AORTIC VALVE REPLACEMENT N/A 10/15/2019    TRANSCATHETER AORTIC VALVE REPLACEMENT FEMORAL APPROACH performed by Silvio Newsome MD at [address] [place] [address] Ave Right 7/2/2019    PERITONEAL DIALYSIS CATHETER REMOVAL performed by Andrew Ha MD at [facility] COLONOSCOPY  2/29/2015    WN    CORONARY ANGIOPLASTY WITH STENT PLACEMENT  05/26/15    CYST REMOVAL  08/14/2013    EXCISION CYSTS, NECK X2 AND ABDOMINAL benign    DIAGNOSTIC CARDIAC CATH LAB PROCEDURE      DIALYSIS FISTULA CREATION Left 10/30/2017    LEFT BRACHIAL CEPHALIC FISTULA    DIALYSIS FISTULA CREATION Left 3/27/2019    LIGATION  AV FISTULA performed by Virginia Umaña MD at [facility], COLON, DIAGNOSTIC      OTHER SURGICAL HISTORY  02/01/2017    laparoscopic cholecystectomy with intraoperative cholangiogram    OTHER SURGICAL HISTORY  2018    PORT PLACEMENT  - vas cath    OTHER SURGICAL HISTORY Bilateral 06/26/2018    laprascopic peritoneal dialysis catheter placement    OTHER SURGICAL HISTORY Right 09/2018    peritoneal dialysis port placed on right side of abdomen    OTHER SURGICAL HISTORY  05/28/2019    PTA/Stenting R External Iliac artery    GA LAP INSERTION TUNNELED INTRAPERITONEAL CATHETER N/A 9/21/2018    LAPAROSCOPIC PERITONEAL DIALYSIS CATHETER REPLACEMENT performed by Andrew Ha MD at [address] [place] [address] Dr JACK 2/24/2022    PERINEAL ABCESS DRAINAGE performed by Andrew Ha MD at [address] [place] [address]  01/06/2016    UPPER GASTROINTESTINAL ENDOSCOPY  01/29/2017    possible candida, otherwise normal appearing    VASCULAR SURGERY  aprx 2 years ago    2 stents placed, each side of groin       Current Medications:    No current facility-administered medications on file prior to encounter. Current Outpatient Medications on File Prior to Encounter   Medication Sig Dispense Refill    mineral oil liquid Take 30 mLs by mouth daily as needed for Constipation 300 mL 0    sennosides-docusate sodium (SENOKOT-S) 8.6-50 MG tablet Take 1 tablet by mouth daily 10 tablet 0    oxyCODONE-acetaminophen (PERCOCET) 7.5-325 MG per tablet Take 1 tablet by mouth every 6 hours as needed (pain) for up to 30 days.  120 tablet 0    cyclobenzaprine (FLEXERIL) 10 MG tablet TAKE 1 TABLET BY MOUTH EVERY 8 HOURS AS NEEDED 90 tablet 2    doxycycline hyclate (VIBRA-TABS) 100 MG tablet Take 1 tablet by mouth 2 times daily for 10 days 20 tablet 0    metoprolol succinate (TOPROL XL) 50 MG extended release tablet Take 1 tablet by mouth in the morning and at bedtime 60 tablet 1    apixaban (ELIQUIS) 5 MG TABS tablet Take 1 tablet by mouth 2 times daily 60 tablet 1    pantoprazole (PROTONIX) 40 MG tablet TAKE 1 TABLET BY MOUTH EVERY MORNING BEFORE BREAKFAST 30 tablet 1    pravastatin (PRAVACHOL) 40 MG tablet Take 1 tablet by mouth daily 90 tablet 3    clopidogrel (PLAVIX) 75 MG tablet Take 1 tablet by mouth daily 90 tablet 3    QUEtiapine (SEROQUEL) 50 MG tablet TAKE 1 TABLET BY MOUTH IN THE EVENING 30 tablet 2    fluticasone (FLONASE) 50 MCG/ACT nasal spray SHAKE LIQUID AND USE 2 SPRAYS IN EACH NOSTRIL DAILY 48 g 0    insulin glargine (BASAGLAR KWIKPEN) 100 UNIT/ML injection pen Inject 30 Units into the skin nightly 15 mL 1    gabapentin (NEURONTIN) 100 MG capsule TAKE 1-2 CAPSULES BY MOUTH THREE TIMES A  capsule 5     MG capsule TAKE 1 CAPSULE BY MOUTH TWICE DAILY 60 capsule 5    Continuous Blood Gluc Sensor (DEXCOM G6 SENSOR) MISC Every 10 days 9 each 3    Continuous Blood Gluc Transmit (DEXCOM G6 TRANSMITTER) MISC 1 each by Does not apply route every 3 months 1 each 3    Continuous Blood Gluc  (DEXCOM G6 ) ADAM 1 each by Does not apply route Daily with lunch 1 each 0    DULoxetine (CYMBALTA) 60 MG extended release capsule TAKE 1 CAPSULE BY MOUTH EVERY DAY 30 capsule 5    traZODone (DESYREL) 150 MG tablet TAKE ONE (1) TABLET BY MOUTH NIGHTLY 30 tablet 10    B Complex-C-Folic Acid (VIRT-CAPS) 1 MG CAPS TAKE 1 CAPSULE BY MOUTH EVERY DAY 90 capsule 1    Calcium Acetate, Phos Binder, 667 MG CAPS TAKE 1 CAPSULE BY MOUTH THREE TIMES DAILY WITH MEALS 90 capsule 3    LINZESS 145 MCG capsule TAKE 1 CAPSULE BY MOUTH EVERY MORNING BEFORE BREAKFAST 30 capsule 10    nitroGLYCERIN (NITROSTAT) 0.4 MG SL tablet DISSOLVE 1 TABLET UNDER THE TONGUE AS NEEDED FOR CHEST PAIN EVERY 5 MINUTES UP TO 3 TIMES. IF NO RELIEF CALL 911. 25 tablet 10    insulin aspart (NOVOLOG FLEXPEN) 100 UNIT/ML injection pen Inject 20 Units into the skin 3 times daily (before meals) 15 pen 5    vitamin D (ERGOCALCIFEROL) 33656 units CAPS capsule TK 1 C PO WEEKLY  11    Tiotropium Bromide-Olodaterol (STIOLTO RESPIMAT) 2.5-2.5 MCG/ACT AERS Inhale 2 puffs into the lungs daily 2 Inhaler 0    Polyethylene Glycol 3350 GRAN       Blood Glucose Monitoring Suppl ADAM USE AS DIRECTED. 1 Device 0    Alcohol Swabs PADS USE AS DIRECTED 300 each 3    albuterol sulfate  (90 Base) MCG/ACT inhaler Inhale 2 puffs into the lungs every 6 hours as needed for Wheezing 1 Inhaler 3    ipratropium-albuterol (DUONEB) 0.5-2.5 (3) MG/3ML SOLN nebulizer solution Inhale 3 mLs into the lungs every 6 hours as needed for Shortness of Breath 360 mL 1    calcium carbonate (TUMS) 500 MG chewable tablet Take 1 tablet by mouth 3 times daily as needed for Heartburn.          Allergies:  Morphine    Social History:    Social History     Socioeconomic History    Marital status:      Spouse name: Not on file    Number of children: Not on file    Years of education: Not on file    Highest education level: Not on file   Occupational History    Not on file   Tobacco Use    Smoking status: Former Smoker     Packs/day: 0.50     Years: 33.00     Pack years: 16.50     Types: Cigarettes     Quit date: 2020     Years since quittin.8    Smokeless tobacco: Never Used    Tobacco comment: [facility] quit 2021   Vaping Use    Vaping Use: Never used   Substance and Sexual Activity    Alcohol use: Not Currently     Alcohol/week: 0.0 standard drinks     Comment: occ    Drug use: No    Sexual activity: Yes     Partners: Female     Comment:    Other Topics Concern    Not on file   Social History Narrative    Not on file     Social Determinants of Health     Financial Resource Strain:     Difficulty of Paying Living Expenses: Not on file   Food Insecurity:     Worried About Running Out of Food in the Last Year: Not on file    Louise of Food in the Last Year: Not on file   Transportation Needs:     Lack of Transportation (Medical): Not on file    Lack of Transportation (Non-Medical):  Not on file   Physical Activity:     Days of Exercise per Week: Not on file    Minutes of Exercise per Session: Not on file   Stress:     Feeling of Stress : Not on file   Social Connections:     Frequency of Communication with Friends and Family: Not on file    Frequency of Social Gatherings with Friends and Family: Not on file    Attends Rastafarian Services: Not on file    Active Member of [address] [place] [address] or Organizations: Not on file    Attends Club or Organization Meetings: Not on file    Marital Status: Not on file   Intimate Partner Violence:     Fear of Current or Ex-Partner: Not on file    Emotionally Abused: Not on file    Physically Abused: Not on file    Sexually Abused: Not on file   Housing Stability:     Unable to Pay for Housing in the Last Year: Not on file    Number of [place] in the Last Year: Not on file    Unstable Housing in the Last Year: Not on file Family History:       Problem Relation Age of Onset    Diabetes Mother     Heart Disease Father     Kidney Disease Sister         stage 4-kidney failure    Cancer Sister     Heart Disease Sister     Obesity Sister     Cancer Sister     Heart Disease Sister     Obesity Sister     Alcohol Abuse Brother        REVIEW OF SYSTEMS:    Review of Systems   Constitutional: Positive for fatigue. HENT: Negative. Respiratory: Positive for shortness of breath. Cardiovascular: Negative. Gastrointestinal: Negative. Genitourinary: Positive for decreased urine volume. Neurological: Positive for weakness. PHYSICAL EXAM:    Vitals:    BP (!) 154/76   Pulse 77   Temp 98.2 °F (36.8 °C) (Oral)   Resp 18   SpO2 100%   No intake/output data recorded. No intake/output data recorded. Physical Exam:  Physical Exam  Vitals reviewed. Constitutional:       General: He is not in acute distress. Appearance: Normal appearance. HENT:      Head: Normocephalic and atraumatic. Eyes:      General: No scleral icterus. Conjunctiva/sclera: Conjunctivae normal.   Cardiovascular:      Rate and Rhythm: Normal rate. Heart sounds: No friction rub. Pulmonary:      Effort: Pulmonary effort is normal. No respiratory distress. Breath sounds: No wheezing. Abdominal:      General: Bowel sounds are normal. There is no distension. Tenderness: There is no abdominal tenderness. Musculoskeletal:      Right lower leg: No edema. Left lower leg: No edema. Neurological:      Mental Status: He is alert. DATA:    Recent Labs     03/17/22  1258   WBC 11.2*   HGB 10.7*   HCT 32.7*   MCV 88.5        Recent Labs     03/17/22  1258   *   K 5.6*   CL 88*   CO2 20*   GLUCOSE 137*   BUN 76*   CREATININE 9.8*   LABGLOM 6*   GFRAA 7*           IMPRESSION/RECOMMENDATIONS:      ESKD. - On HD [address] - Last full HD was 3/11/22.  - TW of 110kg.   - HD tomorrow after new TDC placed. Non-functioning [facility] TDC.  - Will consult IR for replacement in am.    Hyperkalemia.  - From missed dialysis. - Lokelma 10g x 3 doses. Hypervolemic Hyponatremia. - UF with HD.  - Fluid restriction. Hypertension.  - On Metoprolol. CKD-MBD.  - Continue calcium acetate, Hectorol and Cinacalcet. Anemia.  - On EPO qHD. Thank you for allowing me to participate in the care of this patient. Please do not hesitate to contact me at [phone] with questions. Tucker Newsome MD, MD  3/17/2022  [facility]. [facility] no

## 2025-04-28 NOTE — OB PROVIDER H&P - NSLOWDOSEASPIRIN_OBGYN_ALL_OB
TDAP:Had during pregnancy    Influenza:N/A    Pneumonia: N/A    MMR: Given 22    Rhogam:N/A    COVID:Declined      Pre-eclampsia education given    Discharge Video viewed  (   )      Discharge video not viewed, link to video provided to patient (x  )    Information included in folder:  -POST-BIRTH warning signs  -Postpartum or Baby Blues and Postpartum Depression  -Bottle Feeding and Preparation  -Common Postpartum Medication Information  -Safe Sleep Environment  -Tummy Time  -[facility] Car Seat Program  -Summer/Winter Car Seat Safety  -Vaccine information  -[place] Screening Guide  -[facility] Patient Portal     Please call to make a follow up appointment as indicated on the After Visit Summary (AVS)      Congratulations again on the birth of your baby!   The first six weeks following your delivery are known as the postpartum period. Here are some general instructions to help both you and your baby have a healthy and happy postpartum recovery.     Postpartum Instructions for Mom     Rest & Sleep:   · Focus on yourself and baby during this time and get plenty of rest for the first few weeks.   · Sleep when your baby sleeps and allow support people help you rest (care for other children, prepare meals, shopping, cleaning, etc).   · Do not lift anything heavier than your baby.   · Take short walks if possible throughout the day.     Nutrition:   · Eat nutritious and well-balanced meals to provide your body the energy it needs.   · Drink at least 8 glasses of water every day (try drinking a glass of water every time you feed the baby).   · Do not diet at this time.   · Breastfeeding mothers require extra fluid, calories, protein, and calcium.   · Avoid tobacco, alcohol, and non-essential medications when breastfeeding.     Postpartum Bleeding ([place]):   · Expect bleeding for up to 6 weeks.   · Expect normal period odor from your bleeding.   · Change your sanitary pad often and wash your hands before changing.    · DO NOT have intercourse, use tampons, or place anything in your vagina for 6-8 weeks to allow your body time to heal.   Call your doctor/midwife for foul smelling vaginal discharge, large clots, or heavy bleeding (saturating a pad in an hour).     Postpartum Adjustment:   Becoming a mother involves many changes to your mind and body. Although you may have expected to be excited and happy, instead you may be unsure of yourself in your new role, and you may find that you are easily upset, impatient, irritable, or tearful. This is normal and comes and goes quickly. \"Baby blues\" may occur anywhere from a few days after delivery to several weeks postpartum and will pass in a short time.     Postpartum Depression:   Postpartum depression goes beyond \"baby blues\" and is persistent, intense, and severe. The most common symptom is a feeling of deep sadness. You may also feel as if you just can't cope with life. Other symptoms include:   · thoughts of harming yourself or the baby   · lack of interest in the baby, family, or friends   · feeling guilty or worthless   · feeling hopeless   · uncontrollable crying   · feelings of being a bad mother   · trouble sleeping, oversleeping, or feeling tired all the time   · changes in appetite   · strong feelings of irritability, anger, or nervousness   · having trouble thinking clearly or making decisions   · having headaches, aches and pains, or stomach problems that won't go away   · thinking about death or suicide   The exact cause of postpartum depression is unknown. Changes in brain chemistry or structure are believed to play a big role in depression. It may be due to changes in your hormones during and after childbirth. You may also be tired from caring for your baby and adjusting to being a mother. All these factors may make you feel depressed. In some cases, your genes may also play a role.   Postpartum depression can be treated in many ways. Talking to your healthcare  provider is the first step toward feeling better.   Call your doctor or midwife immediately if you:   · Cry for no clear reason   · Have trouble sleeping, eating, and making choices   · Question whether you can handle caring for your baby   · Have intense feelings of sadness, anxiety, or despair that prevent you from being able to do your daily tasks     Here are some additional resources offering support for Postpartum Depression:   · Postpartum Support International: (436) 344-4420   · Postpartum Depression [facility]: 986.284.5897 www.ppdil.org   · Postpartum Support International Helpline: 768-224-4GGY or 399-435-2364 www.postpartum.net   · [place] [facility]: 736.994.9741   · National [place] for Mental Health: (875) 382-4688   · National Suicide Prevention (103) 913-2739     Postpartum Preeclampsia: A serious condition that occurs when a woman has high blood pressure and excess protein in her urine soon after childbirth. It usually occurs within a few days of birth but can develop up to 6 weeks after having the baby.   Preeclampsia can result in seizures and other serious complications and requires prompt treatment.   Call your doctor or midwife immediately if you experience any of the following symptoms that could be signs of Preeclampsia:   · Increased swelling in your face, hands, or legs   · severe headache that doesn't go away   · abdominal pain   · shortness of breath / difficulty breathing   · nausea and vomiting   · confusion   · vision changes: blurred vision or flashing spots   · gain more than 3 pounds in three days     Summary of when to call your doctor or midwife:   · Foul smelling vaginal discharge   · Passing large blood clots or heavy bleeding (saturating a pad in an hour)   · Fever above 100.4F   · Redness, swelling, increased pain or drainage from incision (if you had a )   · Redness & pain in any area of breasts   · Flu-like symptoms (such as fever, chills, body aches,  etc.)   · Fainting, dizziness, or lightheadedness   · Postpartum depression: Crying for no clear reason, having trouble sleeping, eating, and making choices; questioning whether you can handle caring for your baby; having intense feelings of sadness, anxiety, or despair that prevent you from being able to do your daily tasks   · Preeclampsia symptoms: increased swelling in face, hands or legs; headache, abdominal pain, shortness of breath, nausea and vomiting, confusion, vision changes, gaining more than 3 pounds in 3 days.   · New or worsening symptoms or pain, not relieved by medicine or rest     Our hospital and medical team have enjoyed caring for you during this special time, and we wish you a safe and healthy recovery.     If you have additional questions about these discharge instructions, please call [phone]. For breastfeeding support or questions, please contact our lactation consultants at [phone].   No

## 2025-04-28 NOTE — OB PROVIDER H&P - HISTORY OF PRESENT ILLNESS
Pt is a 39yo  @ 40w4d here for IOL 2/2 dec FM. Pt reports "big active movements at midnight" then decreased fetal movement from 3a until now. Pt. also reports intermittent, non-painful, period like contractions since 1a. Pt. denies LOF and vb. PNC uncomplicated GBS (-) EFW 3700g    OBHx: 2020: NVSD, FT, 7#1  GYNHx: +PCOS; denies ovarian cysts, fibroids, hx of abnormal pap or STDs  PMHx: denies  PSurgHx: denies  Allergies: NKDA  Meds: PNV, bASA  Social: No smoking, alcohol, or illicit drug use  Psych: No hx of anxiety or depression

## 2025-04-28 NOTE — OB PROVIDER LABOR PROGRESS NOTE - ASSESSMENT
CB placed  continue pitocin for IOL     Elsi Canals PGY1
SROM @ 10p- clear fluid   Forebag ruptured   C/w pitocin for IOL    D/w Dr. Cecilia Hanna PGY1

## 2025-04-28 NOTE — OB RN PATIENT PROFILE - NS_VBACATTEMPT_OBGYN_ALL_OB
N/A Continue Regimen: Dapsone every morning and Differin every night Detail Level: Detailed Modify Regimen: Finish this month’s doxycycline then discontinue Plan: Reminded patient to be consistent with topicals. If she stops them and the antibiotic then the acne will come back

## 2025-04-28 NOTE — OB PROVIDER H&P - NSPREVIOUSLIVEBIRTHSNOWLIVING_OBGYN_ALL_OB_NU
Ph: [phone], Fax: [phone]                                    [facility]       Reason for admission:                 Elevated Cr    Brief Summary :     Rosemary Pierre is being seen by nephrology for elevated creatinine with known CKD.    Interval History and plan:     Labs reviewed   Known CKD     Plan:  - Follow up urine studies   - urine sodium   - urine protein/cr ratio  - follow up on Renal U/S  - follow Cr daily   - monitor I/Os  - hold lasix for now  - avoid nephrotoxic agents  - if kidney does not show improvement, will plan for kidney biopsy for Friday    Patient in agreement   - if evidence of acute interstitial nephritis, will start high dose steroids   - gentle normal saline IVF @ 100mL/hr       Assessment :     Prerenal LUCIANO   Obstructive Uropathy   Patient has previous history of obstructive uropathy, no kidney stones noted at the time. Resolved with terry catheter placement, this was removed last week which could have resulted in further obstructive uropathy  Acute interstitial nephritis in setting of Keytruda use  On keytruda which could contribute to patient developed an acute interstitial nephritis.   CKD, baseline 1.2-1.3        Please call with questions at-  24 hrs Answering service [phone]   7 am-5 pm at Perfect serve, or cell phone  Dr Brittanie Escobar MD      HPI:     Rosemary Pierre is a 76 y.o. female with pmh of metastatic endometrial cancer, chronic kidney disease who presents with abnormal labs from a skilled nursing facility.  Patient was admitted to [facility] and was discharged on 3/10, was admitted for LUCIANO with hydronephrosis attributed to postobstructive uropathy and was discharged with a Terry catheter.  In the ED patient's creatinine was 5     Vitals:     Vitals:    03/19/25 0746   BP: 109/77   Pulse: 70   Resp: 18   Temp: 97.7 °F (36.5 °C)   SpO2: 93%         Physical Examination:     General appearance: Alert,  1

## 2025-04-28 NOTE — OB PROVIDER H&P - NS_FETALPRESENTATIONA_OBGYN_ALL_OB
Pt has h/o of a-fib on home Coumadin and Toprol XL 200mg. Was difficult to rate control earlier this admission likely 2/2 missed doses of Toprol XL  - on lopressor for rate control. Pt has been tachycardic 90s-120s  - c/w lopressor 75 BID  - INR <2 9/1. c/w Eliquis for further anticoagulation  - if monitor says pt is tachycardic, palpate HR for 1 minute for more accurate rate    #Hyponatremia  - pt continues to have fluctuations in sodium. no longer on fluids. Sodium today 134. May be 2/2 SIADH vs. diarrhea vs. NILS  - f/u ulytes  - continue to monitor Cephalic Pt has h/o of a-fib on home Coumadin and Toprol XL 200mg. Was difficult to rate control earlier this admission likely 2/2 missed doses of Toprol XL  - on lopressor for rate control. Pt has been tachycardic 90s-120s  - c/w lopressor 75 BID  - INR <2 9/1. c/w Eliquis for further anticoagulation  - if monitor says pt is tachycardic, palpate HR for 1 minute for more accurate rate    #Hyponatremia  - pt continues to have fluctuations in sodium. LR @75cc/hr started today AM. Sodium today 139. May be 2/2 SIADH vs. diarrhea vs. NILS  - continue to monitor

## 2025-04-29 PROCEDURE — 59400 OBSTETRICAL CARE: CPT

## 2025-04-29 RX ORDER — SENNA 187 MG
2 TABLET ORAL AT BEDTIME
Refills: 0 | Status: DISCONTINUED | OUTPATIENT
Start: 2025-04-29 | End: 2025-04-30

## 2025-04-29 RX ORDER — OXYCODONE HYDROCHLORIDE 30 MG/1
5 TABLET ORAL
Refills: 0 | Status: DISCONTINUED | OUTPATIENT
Start: 2025-04-29 | End: 2025-04-30

## 2025-04-29 RX ORDER — PRAMOXINE HCL 1 %
1 GEL (GRAM) TOPICAL EVERY 4 HOURS
Refills: 0 | Status: DISCONTINUED | OUTPATIENT
Start: 2025-04-29 | End: 2025-04-30

## 2025-04-29 RX ORDER — BENZOCAINE 220 MG/G
1 SPRAY, METERED PERIODONTAL EVERY 6 HOURS
Refills: 0 | Status: DISCONTINUED | OUTPATIENT
Start: 2025-04-29 | End: 2025-04-30

## 2025-04-29 RX ORDER — IBUPROFEN 200 MG
600 TABLET ORAL EVERY 6 HOURS
Refills: 0 | Status: DISCONTINUED | OUTPATIENT
Start: 2025-04-29 | End: 2025-04-30

## 2025-04-29 RX ORDER — OXYTOCIN-SODIUM CHLORIDE 0.9% IV SOLN 30 UNIT/500ML 30-0.9/5 UT/ML-%
167 SOLUTION INTRAVENOUS
Qty: 30 | Refills: 0 | Status: DISCONTINUED | OUTPATIENT
Start: 2025-04-29 | End: 2025-04-30

## 2025-04-29 RX ORDER — OXYCODONE HYDROCHLORIDE 30 MG/1
5 TABLET ORAL ONCE
Refills: 0 | Status: DISCONTINUED | OUTPATIENT
Start: 2025-04-29 | End: 2025-04-30

## 2025-04-29 RX ORDER — WITCH HAZEL LEAF
1 FLUID EXTRACT MISCELLANEOUS EVERY 4 HOURS
Refills: 0 | Status: DISCONTINUED | OUTPATIENT
Start: 2025-04-29 | End: 2025-04-30

## 2025-04-29 RX ORDER — IBUPROFEN 200 MG
600 TABLET ORAL EVERY 6 HOURS
Refills: 0 | Status: COMPLETED | OUTPATIENT
Start: 2025-04-29 | End: 2026-03-28

## 2025-04-29 RX ORDER — CLOSTRIDIUM TETANI TOXOID ANTIGEN (FORMALDEHYDE INACTIVATED), CORYNEBACTERIUM DIPHTHERIAE TOXOID ANTIGEN (FORMALDEHYDE INACTIVATED), BORDETELLA PERTUSSIS TOXOID ANTIGEN (GLUTARALDEHYDE INACTIVATED), BORDETELLA PERTUSSIS FILAMENTOUS HEMAGGLUTININ ANTIGEN (FORMALDEHYDE INACTIVATED), BORDETELLA PERTUSSIS PERTACTIN ANTIGEN, AND BORDETELLA PERTUSSIS FIMBRIAE 2/3 ANTIGEN 5; 2; 2.5; 5; 3; 5 [LF]/.5ML; [LF]/.5ML; UG/.5ML; UG/.5ML; UG/.5ML; UG/.5ML
0.5 INJECTION, SUSPENSION INTRAMUSCULAR ONCE
Refills: 0 | Status: DISCONTINUED | OUTPATIENT
Start: 2025-04-29 | End: 2025-04-30

## 2025-04-29 RX ORDER — PRENATAL 136/IRON/FOLIC ACID 27 MG-1 MG
1 TABLET ORAL DAILY
Refills: 0 | Status: DISCONTINUED | OUTPATIENT
Start: 2025-04-29 | End: 2025-04-30

## 2025-04-29 RX ORDER — DIPHENHYDRAMINE HCL 12.5MG/5ML
25 ELIXIR ORAL EVERY 6 HOURS
Refills: 0 | Status: DISCONTINUED | OUTPATIENT
Start: 2025-04-29 | End: 2025-04-30

## 2025-04-29 RX ORDER — DIBUCAINE 10 MG/G
1 OINTMENT TOPICAL EVERY 6 HOURS
Refills: 0 | Status: DISCONTINUED | OUTPATIENT
Start: 2025-04-29 | End: 2025-04-30

## 2025-04-29 RX ORDER — MODIFIED LANOLIN 100 %
1 CREAM (GRAM) TOPICAL EVERY 6 HOURS
Refills: 0 | Status: DISCONTINUED | OUTPATIENT
Start: 2025-04-29 | End: 2025-04-30

## 2025-04-29 RX ORDER — KETOROLAC TROMETHAMINE 30 MG/ML
30 INJECTION, SOLUTION INTRAMUSCULAR; INTRAVENOUS ONCE
Refills: 0 | Status: DISCONTINUED | OUTPATIENT
Start: 2025-04-29 | End: 2025-04-29

## 2025-04-29 RX ORDER — HYDROCORTISONE 10 MG/G
1 CREAM TOPICAL EVERY 6 HOURS
Refills: 0 | Status: DISCONTINUED | OUTPATIENT
Start: 2025-04-29 | End: 2025-04-30

## 2025-04-29 RX ORDER — SIMETHICONE 80 MG
80 TABLET,CHEWABLE ORAL EVERY 4 HOURS
Refills: 0 | Status: DISCONTINUED | OUTPATIENT
Start: 2025-04-29 | End: 2025-04-30

## 2025-04-29 RX ORDER — ACETAMINOPHEN 500 MG/5ML
975 LIQUID (ML) ORAL
Refills: 0 | Status: DISCONTINUED | OUTPATIENT
Start: 2025-04-29 | End: 2025-04-30

## 2025-04-29 RX ORDER — MAGNESIUM HYDROXIDE 400 MG/5ML
30 SUSPENSION ORAL
Refills: 0 | Status: DISCONTINUED | OUTPATIENT
Start: 2025-04-29 | End: 2025-04-30

## 2025-04-29 RX ADMIN — Medication 975 MILLIGRAM(S): at 15:02

## 2025-04-29 RX ADMIN — Medication 600 MILLIGRAM(S): at 12:42

## 2025-04-29 RX ADMIN — Medication 600 MILLIGRAM(S): at 23:36

## 2025-04-29 RX ADMIN — OXYTOCIN-SODIUM CHLORIDE 0.9% IV SOLN 30 UNIT/500ML 167 MILLIUNIT(S)/MIN: 30-0.9/5 SOLUTION at 01:05

## 2025-04-29 RX ADMIN — Medication 2 TABLET(S): at 23:36

## 2025-04-29 RX ADMIN — KETOROLAC TROMETHAMINE 30 MILLIGRAM(S): 30 INJECTION, SOLUTION INTRAMUSCULAR; INTRAVENOUS at 03:36

## 2025-04-29 RX ADMIN — Medication 600 MILLIGRAM(S): at 13:40

## 2025-04-29 RX ADMIN — Medication 600 MILLIGRAM(S): at 18:30

## 2025-04-29 RX ADMIN — Medication 975 MILLIGRAM(S): at 16:00

## 2025-04-29 RX ADMIN — Medication 1 TABLET(S): at 12:42

## 2025-04-29 RX ADMIN — Medication 600 MILLIGRAM(S): at 17:36

## 2025-04-29 NOTE — OB PROVIDER DELIVERY SUMMARY - NSPROVIDERDELIVERYNOTE_OBGYN_ALL_OB_FT
of live male infant, APGARS 9, 9, 3VC. Placenta delivered spontaneously intact. Cervix examined and intact. Perineum examined and intact. Rectal wnl. Mom to recover in room in stable condition. Baby to nursery in stable condition. EBL 250cc.  MD Cecilia

## 2025-04-29 NOTE — OB RN DELIVERY SUMMARY - NS_SEPSISRSKCALC_OBGYN_ALL_OB_FT
EOS calculated successfully. EOS Risk Factor: 0.5/1000 live births ([facility] national incidence); GA=40w5d; Temp=99; ROM=3; GBS='Negative'; Antibiotics='No antibiotics or any antibiotics < 2 hrs prior to birth'

## 2025-04-29 NOTE — OB RN DELIVERY SUMMARY - NSSELHIDDEN_OBGYN_ALL_OB_FT
[NS_DeliveryAttending1_OBGYN_ALL_OB_FT:[id number]==],[NS_DeliveryRN_OBGYN_ALL_OB_FT:[id number]=]

## 2025-04-29 NOTE — OB RN DELIVERY SUMMARY - NSDELIVERYTYPEA_OBGYN_ALL_OB
Vaginal Delivery Cheiloplasty (Complex) Text: A decision was made to reconstruct the defect with a  cheiloplasty.  The defect was undermined extensively.  Additional obicularis oris muscle was excised with a 15 blade scalpel.  The defect was converted into a full thickness wedge to facilite a better cosmetic result.  Small vessels were then tied off with 5-0 monocyrl. The obicularis oris, superficial fascia, adipose and dermis were then reapproximated.  After the deeper layers were approximated the epidermis was reapproximated with particular care given to realign the vermilion border.

## 2025-04-30 ENCOUNTER — TRANSCRIPTION ENCOUNTER (OUTPATIENT)
Age: 40
End: 2025-04-30

## 2025-04-30 VITALS
TEMPERATURE: 98 F | SYSTOLIC BLOOD PRESSURE: 107 MMHG | HEART RATE: 71 BPM | RESPIRATION RATE: 18 BRPM | OXYGEN SATURATION: 97 % | DIASTOLIC BLOOD PRESSURE: 70 MMHG

## 2025-04-30 PROCEDURE — 59050 FETAL MONITOR W/REPORT: CPT

## 2025-04-30 PROCEDURE — 86901 BLOOD TYPING SEROLOGIC RH(D): CPT

## 2025-04-30 PROCEDURE — 86900 BLOOD TYPING SEROLOGIC ABO: CPT

## 2025-04-30 PROCEDURE — 85025 COMPLETE CBC W/AUTO DIFF WBC: CPT

## 2025-04-30 PROCEDURE — 86780 TREPONEMA PALLIDUM: CPT

## 2025-04-30 PROCEDURE — 86850 RBC ANTIBODY SCREEN: CPT

## 2025-04-30 RX ORDER — IBUPROFEN 200 MG
1 TABLET ORAL
Qty: 0 | Refills: 0 | DISCHARGE
Start: 2025-04-30

## 2025-04-30 RX ORDER — PRENATAL 136/IRON/FOLIC ACID 27 MG-1 MG
1 TABLET ORAL
Qty: 0 | Refills: 0 | DISCHARGE
Start: 2025-04-30

## 2025-04-30 RX ORDER — MODIFIED LANOLIN 100 %
1 CREAM (GRAM) TOPICAL
Qty: 0 | Refills: 0 | DISCHARGE
Start: 2025-04-30

## 2025-04-30 RX ORDER — ACETAMINOPHEN 500 MG/5ML
3 LIQUID (ML) ORAL
Qty: 0 | Refills: 0 | DISCHARGE
Start: 2025-04-30

## 2025-04-30 RX ORDER — BENZOCAINE 220 MG/G
1 SPRAY, METERED PERIODONTAL
Qty: 0 | Refills: 0 | DISCHARGE
Start: 2025-04-30

## 2025-04-30 RX ORDER — DIBUCAINE 10 MG/G
1 OINTMENT TOPICAL
Qty: 0 | Refills: 0 | DISCHARGE
Start: 2025-04-30

## 2025-04-30 RX ORDER — HYDROCORTISONE 10 MG/G
1 CREAM TOPICAL
Qty: 0 | Refills: 0 | DISCHARGE
Start: 2025-04-30

## 2025-04-30 RX ADMIN — Medication 600 MILLIGRAM(S): at 00:06

## 2025-04-30 RX ADMIN — Medication 975 MILLIGRAM(S): at 09:38

## 2025-04-30 RX ADMIN — Medication 600 MILLIGRAM(S): at 05:40

## 2025-04-30 RX ADMIN — Medication 975 MILLIGRAM(S): at 10:08

## 2025-04-30 RX ADMIN — Medication 600 MILLIGRAM(S): at 05:10

## 2025-04-30 NOTE — DISCHARGE NOTE OB - CARE PLAN
1 Principal Discharge DX:	 (normal spontaneous vaginal delivery)  Assessment and plan of treatment:	nothing in the vagina (no sex, tampons, swimming, tub baths), follow up in office in 6 weeks

## 2025-04-30 NOTE — DISCHARGE NOTE OB - CARE PROVIDER_API CALL
Pinky Whalen  Obstetrics and Gynecology  [address] [place] [address], [facility] [address]  [place] [address]  Phone: [phone]  Fax: [phone]  Follow Up Time:

## 2025-04-30 NOTE — DISCHARGE NOTE OB - MATERIALS PROVIDED
[facility] [place] Screening Program/Breastfeeding Log/Breastfeeding Mother’s Support Group Information/Guide to Postpartum Care/[facility] Hearing Screen Program/Back To Sleep Handout/Shaken Baby Prevention Handout/Breastfeeding Guide and Packet/Birth Certificate Instructions/Discharge Medication Information for Patients and Families Pocket Guide

## 2025-04-30 NOTE — PROGRESS NOTE ADULT - SUBJECTIVE AND OBJECTIVE BOX
PA Postpartum Note- PPD#1    Prenatal Labs  Blood type: B Positive  Rubella IgG: IMMune  RPR: Negative      S:Patient w/o complaints, pain is controlled.    Pt is OOB, tolerating PO, voiding. Lochia WNL.     O:  Vital Signs Last 24 Hrs  T(C): 36.5 (30 Apr 2025 05:23), Max: 36.8 (29 Apr 2025 17:04)  T(F): 97.7 (30 Apr 2025 05:23), Max: 98.2 (29 Apr 2025 17:04)  HR: 71 (30 Apr 2025 05:23) (71 - 80)  BP: 107/70 (30 Apr 2025 05:23) (102/68 - 111/72)  BP(mean): --  RR: 18 (30 Apr 2025 05:23) (18 - 18)  SpO2: 97% (30 Apr 2025 05:23) (96% - 97%)    Parameters below as of 30 Apr 2025 05:23  Patient On (Oxygen Delivery Method): room air         Gen: NAD  Abdomen: Soft, nontender, non-distended, fundus firm.  Vaginal: Lochia WNL,    Ext: Neg edema, Neg calf tenderness    LABS:    Hemoglobin: 11.9 g/dL (04-28 @ 09:02)      Hematocrit: 33.0 % (04-28 @ 09:02)

## 2025-04-30 NOTE — DISCHARGE NOTE OB - MEDICATION SUMMARY - MEDICATIONS TO TAKE
I will START or STAY ON the medications listed below when I get home from the hospital:    ibuprofen 600 mg oral tablet  -- 1 tab(s) by mouth every 6 hours  -- Indication: For moderate pain    acetaminophen 325 mg oral tablet  -- 3 tab(s) by mouth every 6 hours as needed for  mild pain  -- Indication: For mild pain    benzocaine 20% topical spray  -- 1 Apply on skin to affected area every 6 hours As needed for Perineal discomfort  -- Indication: For perineal pain    dibucaine 1% topical ointment  -- 1 Apply on skin to affected area every 6 hours As needed Perineal discomfort  -- Indication: For perineal pain    hydrocortisone 1% topical cream  -- 1 Apply on skin to affected area every 6 hours As needed Moderate Pain (4-6)  -- Indication: For perineal pain    lanolin topical ointment  -- 1 Apply on skin to affected area every 6 hours As needed nipple soreness  -- Indication: For Nipple pain    Prenatal Multivitamins with Folic Acid 1 mg oral tablet  -- 1 tab(s) by mouth once a day  -- Indication: For Nutrition

## 2025-04-30 NOTE — DISCHARGE NOTE OB - FINANCIAL ASSISTANCE
[facility] provides services at a reduced cost to those who are determined to be eligible through [facility]’s financial assistance program. Information regarding [facility]’s financial assistance program can be found by going to [email or web address] or by calling [phone].

## 2025-04-30 NOTE — DISCHARGE NOTE OB - PATIENT PORTAL LINK FT
You can access the FollowMyHealth Patient Portal offered by [facility] by registering at the following website: [email or web address]. By joining [employer]’s FollowMyHealth portal, you will also be able to view your health information using other applications (apps) compatible with our system.

## 2025-04-30 NOTE — DISCHARGE NOTE OB - HOSPITAL COURSE
39yo P1 presented at term with decreased fetal movement, labor induced and patient delivered by , uncomplicated postpartum course, pt discharged home PPD#1

## 2025-04-30 NOTE — DISCHARGE NOTE OB - NSPROMEDSBROUGHTTOHOSP_GEN_A_NUR
Current patient location: [address] [place] [id number]    Is the patient currently in the state of [place]? YES    Visit mode:VIDEO    If the visit is dropped, the patient can be reconnected by: VIDEO VISIT: Text to cell phone:   Telephone Information:   Mobile [phone]       Will anyone else be joining the visit? NO  (If patient encounters technical issues they should call [phone]    How would you like to obtain your AVS? MyChart    Are changes needed to the allergy or medication list? Pt stated no changes to allergies and Pt stated no med changes    Are refills needed on medications prescribed by this physician? NO    Rooming Documentation:  Questionnaire(s) completed      Reason for visit: RECHECK    Etelvina Lemus VF      
no

## 2025-05-01 ENCOUNTER — NON-APPOINTMENT (OUTPATIENT)
Age: 40
End: 2025-05-01

## 2025-05-05 NOTE — OB RN PATIENT PROFILE - EDUCATION OF PARENTS ON THE BENEFITS OF SKIN TO SKIN AND BREASTFEEDING TO BOTH MOTHER AND INFANT.
After surgery now experiencing insomnia and constipation. Is there anything OTC you recommend? Is Benadryl okay to take at night for sleep?    Statement Selected

## 2025-05-09 ENCOUNTER — NON-APPOINTMENT (OUTPATIENT)
Age: 40
End: 2025-05-09

## 2025-05-16 ENCOUNTER — NON-APPOINTMENT (OUTPATIENT)
Age: 40
End: 2025-05-16

## 2025-05-30 ENCOUNTER — NON-APPOINTMENT (OUTPATIENT)
Age: 40
End: 2025-05-30

## 2025-06-03 ENCOUNTER — NON-APPOINTMENT (OUTPATIENT)
Age: 40
End: 2025-06-03

## 2025-06-18 ENCOUNTER — APPOINTMENT (OUTPATIENT)
Dept: OBGYN | Facility: CLINIC | Age: 40
End: 2025-06-18
Payer: COMMERCIAL

## 2025-06-18 VITALS
SYSTOLIC BLOOD PRESSURE: 110 MMHG | HEIGHT: 65 IN | BODY MASS INDEX: 34.49 KG/M2 | WEIGHT: 207 LBS | DIASTOLIC BLOOD PRESSURE: 71 MMHG

## 2025-06-18 PROCEDURE — 0503F POSTPARTUM CARE VISIT: CPT

## 2025-08-18 ENCOUNTER — APPOINTMENT (OUTPATIENT)
Dept: OBGYN | Facility: CLINIC | Age: 40
End: 2025-08-18
Payer: COMMERCIAL

## 2025-08-18 VITALS
SYSTOLIC BLOOD PRESSURE: 110 MMHG | WEIGHT: 207 LBS | DIASTOLIC BLOOD PRESSURE: 71 MMHG | HEIGHT: 65 IN | BODY MASS INDEX: 34.49 KG/M2

## 2025-08-18 DIAGNOSIS — Z01.419 ENCOUNTER FOR GYNECOLOGICAL EXAMINATION (GENERAL) (ROUTINE) W/OUT ABNORMAL FINDINGS: ICD-10-CM

## 2025-08-18 DIAGNOSIS — Z11.51 ENCOUNTER FOR SCREENING FOR HUMAN PAPILLOMAVIRUS (HPV): ICD-10-CM

## 2025-08-18 PROCEDURE — 99395 PREV VISIT EST AGE 18-39: CPT

## 2025-08-20 LAB — HPV HIGH+LOW RISK DNA PNL CVX: NOT DETECTED

## 2025-08-28 ENCOUNTER — TRANSCRIPTION ENCOUNTER (OUTPATIENT)
Age: 40
End: 2025-08-28

## 2025-08-28 LAB — CYTOLOGY CVX/VAG DOC THIN PREP: NORMAL

## 2025-09-02 ENCOUNTER — TRANSCRIPTION ENCOUNTER (OUTPATIENT)
Age: 40
End: 2025-09-02

## 2025-09-04 ENCOUNTER — TRANSCRIPTION ENCOUNTER (OUTPATIENT)
Age: 40
End: 2025-09-04